# Patient Record
Sex: FEMALE | Race: WHITE
[De-identification: names, ages, dates, MRNs, and addresses within clinical notes are randomized per-mention and may not be internally consistent; named-entity substitution may affect disease eponyms.]

---

## 2019-09-25 ENCOUNTER — HOSPITAL ENCOUNTER (EMERGENCY)
Dept: HOSPITAL 75 - ER | Age: 19
LOS: 1 days | Discharge: HOME | End: 2019-09-26
Payer: COMMERCIAL

## 2019-09-25 VITALS — BODY MASS INDEX: 11.76 KG/M2 | HEIGHT: 66.93 IN | WEIGHT: 74.96 LBS

## 2019-09-25 DIAGNOSIS — R19.7: ICD-10-CM

## 2019-09-25 DIAGNOSIS — R10.84: Primary | ICD-10-CM

## 2019-09-25 PROCEDURE — 85027 COMPLETE CBC AUTOMATED: CPT

## 2019-09-25 PROCEDURE — 85007 BL SMEAR W/DIFF WBC COUNT: CPT

## 2019-09-25 PROCEDURE — 80053 COMPREHEN METABOLIC PANEL: CPT

## 2019-09-25 PROCEDURE — 84703 CHORIONIC GONADOTROPIN ASSAY: CPT

## 2019-09-25 PROCEDURE — 83735 ASSAY OF MAGNESIUM: CPT

## 2019-09-25 PROCEDURE — 36415 COLL VENOUS BLD VENIPUNCTURE: CPT

## 2019-09-25 PROCEDURE — 83690 ASSAY OF LIPASE: CPT

## 2019-09-25 PROCEDURE — 81000 URINALYSIS NONAUTO W/SCOPE: CPT

## 2019-09-26 LAB
ALBUMIN SERPL-MCNC: 4.3 GM/DL (ref 3.2–4.5)
ALP SERPL-CCNC: 100 U/L (ref 40–136)
ALT SERPL-CCNC: 14 U/L (ref 0–55)
APTT PPP: YELLOW S
BACTERIA #/AREA URNS HPF: (no result) /HPF
BASOPHILS # BLD AUTO: 0 10^3/UL (ref 0–0.1)
BASOPHILS NFR BLD AUTO: 0 % (ref 0–10)
BASOPHILS NFR BLD MANUAL: 0 %
BILIRUB SERPL-MCNC: 0.2 MG/DL (ref 0.1–1)
BILIRUB UR QL STRIP: NEGATIVE
BUN/CREAT SERPL: 14
CALCIUM SERPL-MCNC: 9.2 MG/DL (ref 8.5–10.1)
CHLORIDE SERPL-SCNC: 106 MMOL/L (ref 98–107)
CO2 SERPL-SCNC: 23 MMOL/L (ref 21–32)
CREAT SERPL-MCNC: 0.78 MG/DL (ref 0.6–1.3)
EOSINOPHIL # BLD AUTO: 0.6 10^3/UL (ref 0–0.3)
EOSINOPHIL NFR BLD AUTO: 4 % (ref 0–10)
EOSINOPHIL NFR BLD MANUAL: 5 %
ERYTHROCYTE [DISTWIDTH] IN BLOOD BY AUTOMATED COUNT: 13 % (ref 10–14.5)
FIBRINOGEN PPP-MCNC: CLEAR MG/DL
GFR SERPLBLD BASED ON 1.73 SQ M-ARVRAT: > 60 ML/MIN
GLUCOSE SERPL-MCNC: 115 MG/DL (ref 70–105)
GLUCOSE UR STRIP-MCNC: NEGATIVE MG/DL
HCT VFR BLD CALC: 42 % (ref 35–52)
HGB BLD-MCNC: 14 G/DL (ref 11.5–16)
KETONES UR QL STRIP: NEGATIVE
LEUKOCYTE ESTERASE UR QL STRIP: NEGATIVE
LYMPHOCYTES # BLD AUTO: 2.4 X 10^3 (ref 1–4)
LYMPHOCYTES NFR BLD AUTO: 15 % (ref 12–44)
MAGNESIUM SERPL-MCNC: 2.3 MG/DL (ref 1.6–2.4)
MANUAL DIFFERENTIAL PERFORMED BLD QL: YES
MCH RBC QN AUTO: 31 PG (ref 25–34)
MCHC RBC AUTO-ENTMCNC: 33 G/DL (ref 32–36)
MCV RBC AUTO: 92 FL (ref 80–99)
MONOCYTES # BLD AUTO: 1.1 X 10^3 (ref 0–1)
MONOCYTES NFR BLD AUTO: 7 % (ref 0–12)
MONOCYTES NFR BLD: 5 %
NEUTROPHILS # BLD AUTO: 11.6 X 10^3 (ref 1.8–7.8)
NEUTROPHILS NFR BLD AUTO: 74 % (ref 42–75)
NEUTS BAND NFR BLD MANUAL: 71 %
NEUTS BAND NFR BLD: 2 %
NITRITE UR QL STRIP: NEGATIVE
PH UR STRIP: 7 [PH] (ref 5–9)
PLATELET # BLD: 320 10^3/UL (ref 130–400)
PMV BLD AUTO: 9.6 FL (ref 7.4–10.4)
POTASSIUM SERPL-SCNC: 3.9 MMOL/L (ref 3.6–5)
PROT SERPL-MCNC: 7.8 GM/DL (ref 6.4–8.2)
PROT UR QL STRIP: NEGATIVE
RBC #/AREA URNS HPF: (no result) /HPF
RBC MORPH BLD: NORMAL
SODIUM SERPL-SCNC: 139 MMOL/L (ref 135–145)
SP GR UR STRIP: 1.01 (ref 1.02–1.02)
SQUAMOUS #/AREA URNS HPF: (no result) /HPF
UROBILINOGEN UR-MCNC: NORMAL MG/DL
VARIANT LYMPHS NFR BLD MANUAL: 14 %
VARIANT LYMPHS NFR BLD MANUAL: 3 %
WBC # BLD AUTO: 15.7 10^3/UL (ref 4.3–11)
WBC #/AREA URNS HPF: (no result) /HPF

## 2019-09-26 NOTE — ED ABDOMINAL PAIN
General


Chief Complaint:  Abdominal/GI Problems


Stated Complaint:  ABD PAIN


Nursing Triage Note:  


Pt amb to room #5 with c/o medial lt abd discomfort that began approx 0300 while




@ work this morning. Pt reports discomfort is accompanied by nausea, diarrhea, 


and chills.  Pt states, "I have a history of gas problems." Pt reports @ 2130 on




this day she took x1 gasX and "some" milk of magnesia.


Source of Information:  Patient


Exam Limitations:  No Limitations





History of Present Illness


Date Seen by Provider:  Sep 25, 2019


Time Seen by Provider:  23:25


Initial Comments


This 18-year-old young lady presents to the emergency room with an acute episode

of abdominal pain.  It seems to be migratory and involves both the epigastric 

region in the lower quadrants.  Vital signs are stable and she is afebrile.  

Symptoms started around 03:00.  Pain waxes and wanes.  She has nausea without 

vomiting.  She has had chills without fever.  She has had multiple episodes of 

diarrhea.  She took Gas-X and milk of magnesia hoping to "flush everything out".

 She also has a rash on the left arm that has been present for about 4 days now.

 It is pruritic and she does not know the cause.  Dr. James is her primary 

care provider.  Patient notes that she has had other episodes similar to this 

but of lesser intensity in the past.  She wonders if perhaps she has irritable 

bowel syndrome.





Allergies and Home Medications


Allergies


Coded Allergies:  


     No Known Drug Allergies (Unverified , 11/2/14)





Home Medications


Dicyclomine Hcl 20 Mg Tablet, 1 EACH PO QID PRN


   Prescribed by: SARAH SANCHEZ on 11/2/14 2319


Doxycycline Hyclate 100 Mg Capsule, 1 CAP PO BID


   start the day after finishing bactrim for treatment of urinary tract infecit

on 


   Prescribed by: KELSEY BURRELL on 1/1/15 1425


Hyoscyamine Sulfate 0.125 Mg Tab.subl, 0.125 MG SL Q4H PRN for CRAMPS


   Prescribed by: TODD HUMPHREYS on 9/26/19 0109


Ondansetron 4 Mg Tab.rapdis, 4 MG SL Q4H PRN for NAUSEA/VOMITING


   Prescribed by: TODD HUMPHREYS on 9/26/19 0109


Sulfamethoxazole/Trimethoprim 1 Tab Tablet, 1 TAB PO BID


   Prescribed by: KELSEY BURRELL on 1/1/15 1427





Patient Home Medication List


Home Medication List Reviewed:  Yes





Review of Systems


Review of Systems


Constitutional:  see HPI


EENTM:  No Symptoms Reported


Respiratory:  No Symptoms Reported


Cardiovascular:  No Symptoms Reported


Gastrointestinal:  See HPI


Genitourinary:  No Symptoms Reported


Musculoskeletal:  no symptoms reported


Skin:  no symptoms reported


Psychiatric/Neurological:  No Symptoms Reported


Endocrine:  No Symptoms Reported


Hematologic/Lymphatic:  No Symptoms Reported





Past Medical-Social-Family Hx


Past Med/Social Hx:  Reviewed Nursing Past Med/Soc Hx


Patient Social History


Recent Foreign Travel:  No


Contact w/Someone Who Travel:  No


Recent Infectious Disease Expo:  No


Ebola Symptoms:  Stomach Pain





Immunizations Up To Date


Tetanus Booster (TDap):  Unknown


PED Vaccines UTD:  Yes





Seasonal Allergies


Seasonal Allergies:  No





Past Medical History


Surgeries:  No


Respiratory:  No


Cardiac:  No


Neurological:  No


Pregnant:  No


Reproductive Disorders:  No


Female Reproductive Disorders:  Denies


Sexually Transmitted Disease:  No


HIV/AIDS:  No


Gastrointestinal:  No


Musculoskeletal:  No


Endocrine:  No


HEENT:  No


Cancer:  No


Psychosocial:  No


Integumentary:  No


Blood Disorders:  No


Adverse Reaction/Blood Tranf:  No





Family Medical History








No Family History of:


  AIDS


  Abdominal aortic aneurysm


  Shackelford's disease


  Alcoholism


  Alzheimer's disease


  Aphasia


  Arthritis


  Asthma


  Cancer of mouth


  Cardiovascular disease


  Cataracts


  Colon cancer


  Completed stroke


  Congenital disease


  Congenital heart disease


  Coronary thrombosis


  Cystic fibrosis


  Deafness or hearing loss


  Dementia


  Diabetes mellitus


  Drug abuse


  Dysphasia


  Fibrocystic disease of breast


  Gastroenteritis


  Glaucoma


  Headache disorder


  Hypercholesterolemia


  Hypertension


  Infertility


  Kidney disease


  Myocardial infarction


  Neoplasm


  Not obtainable due to adoption


  Osteoporosis


  Parkinson's disease


  Prostate cancer


  Psychosocial problem


  Respiratory disorder


  Seizure disorder


  Severe allergy


  Thyroid disease


  Tuberculosis





Physical Exam


Vital Signs





Vital Signs - First Documented








 9/25/19 9/26/19





 22:55 01:12


 


Temp 36.9 


 


Pulse 64 


 


Resp 16 


 


B/P (MAP) 125/74 


 


Pulse Ox  100


 


O2 Delivery Room Air 





Capillary Refill :


Height/Weight/BMI


Height: 5'9.00"


Weight: 151lbs. 1.0oz. [id number]; 11.00 BMI


Method:Estimated


General Appearance:  WD/WN, no apparent distress


HEENT:  PERRL/EOMI, normal ENT inspection


Neck:  normal inspection


Respiratory:  lungs clear, normal breath sounds, no respiratory distress, no 

accessory muscle use


Cardiovascular:  regular rate, rhythm, no edema, no murmur


Gastrointestinal:  normal bowel sounds, soft; No distended, No guarding; 

tenderness (mild in the epigastrium and lower quadrants)


Extremities:  normal inspection, no pedal edema


Neurologic/Psychiatric:  CNs II-XII nml as tested, no motor/sensory deficits, 

alert, normal mood/affect, oriented x 3


Skin:  normal color, warm/dry





Progress/Results/Core Measures


Results/Orders


Lab Results





Laboratory Tests








Test


 9/25/19


23:03 9/26/19


00:12 Range/Units


 


 


White Blood Count


 15.7 H


 


 4.3-11.0


10^3/uL


 


Red Blood Count


 4.59 


 


 4.35-5.85


10^6/uL


 


Hemoglobin 14.0   11.5-16.0  G/DL


 


Hematocrit 42   35-52  %


 


Mean Corpuscular Volume 92   80-99  FL


 


Mean Corpuscular Hemoglobin 31   25-34  PG


 


Mean Corpuscular Hemoglobin


Concent 33 


 


 32-36  G/DL





 


Red Cell Distribution Width 13.0   10.0-14.5  %


 


Platelet Count


 320 


 


 130-400


10^3/uL


 


Mean Platelet Volume 9.6   7.4-10.4  FL


 


Neutrophils (%) (Auto) 74   42-75  %


 


Lymphocytes (%) (Auto) 15   12-44  %


 


Monocytes (%) (Auto) 7   0-12  %


 


Eosinophils (%) (Auto) 4   0-10  %


 


Basophils (%) (Auto) 0   0-10  %


 


Neutrophils # (Auto) 11.6 H  1.8-7.8  X 10^3


 


Lymphocytes # (Auto) 2.4   1.0-4.0  X 10^3


 


Monocytes # (Auto) 1.1 H  0.0-1.0  X 10^3


 


Eosinophils # (Auto)


 0.6 H


 


 0.0-0.3


10^3/uL


 


Basophils # (Auto)


 0.0 


 


 0.0-0.1


10^3/uL


 


Neutrophils % (Manual) 71    %


 


Lymphocytes % (Manual) 14    %


 


Monocytes % (Manual) 5    %


 


Eosinophils % (Manual) 5    %


 


Basophils % (Manual) 0    %


 


Band Neutrophils 2    %


 


Reactive Lymphocytes 3    %


 


Blood Morphology Comment NORMAL    


 


Sodium Level 139   135-145  MMOL/L


 


Potassium Level 3.9   3.6-5.0  MMOL/L


 


Chloride Level 106     MMOL/L


 


Carbon Dioxide Level 23   21-32  MMOL/L


 


Anion Gap 10   5-14  MMOL/L


 


Blood Urea Nitrogen 11   7-18  MG/DL


 


Creatinine


 0.78 


 


 0.60-1.30


MG/DL


 


Estimat Glomerular Filtration


Rate > 60 


 


  





 


BUN/Creatinine Ratio 14    


 


Glucose Level 115 H    MG/DL


 


Calcium Level 9.2   8.5-10.1  MG/DL


 


Corrected Calcium 9.0   8.5-10.1  MG/DL


 


Magnesium Level 2.3   1.6-2.4  MG/DL


 


Total Bilirubin 0.2   0.1-1.0  MG/DL


 


Aspartate Amino Transf


(AST/SGOT) 12 


 


 5-34  U/L





 


Alanine Aminotransferase


(ALT/SGPT) 14 


 


 0-55  U/L





 


Alkaline Phosphatase 100     U/L


 


Total Protein 7.8   6.4-8.2  GM/DL


 


Albumin 4.3   3.2-4.5  GM/DL


 


Lipase 17   8-78  U/L


 


Serum Pregnancy Test,


Qualitative NEGATIVE 


 


 NEGATIVE  





 


Urine Color  YELLOW   


 


Urine Clarity  CLEAR   


 


Urine pH  7  5-9  


 


Urine Specific Gravity  1.010 L 1.016-1.022  


 


Urine Protein  NEGATIVE  NEGATIVE  


 


Urine Glucose (UA)  NEGATIVE  NEGATIVE  


 


Urine Ketones  NEGATIVE  NEGATIVE  


 


Urine Nitrite  NEGATIVE  NEGATIVE  


 


Urine Bilirubin  NEGATIVE  NEGATIVE  


 


Urine Urobilinogen  NORMAL  NORMAL  MG/DL


 


Urine Leukocyte Esterase  NEGATIVE  NEGATIVE  


 


Urine RBC (Auto)  2+ H NEGATIVE  


 


Urine RBC  0-2   /HPF


 


Urine WBC  NONE   /HPF


 


Urine Squamous Epithelial


Cells 


 2-5 


  /HPF





 


Urine Crystals  NONE   /LPF


 


Urine Bacteria  TRACE   /HPF


 


Urine Casts  NONE   /LPF


 


Urine Mucus  NEGATIVE   /LPF


 


Urine Culture Indicated  NO   








My Orders





Orders - TODD BECKER MD


Cbc With Automated Diff (9/25/19 23:03)


Hcg,Qualitative Serum (9/25/19 23:03)


Comprehensive Metabolic Panel (9/25/19 23:03)


Magnesium (9/25/19 23:03)


Lipase (9/26/19 00:26)


Ua Culture If Indicated (9/26/19 00:26)


Ketorolac Injection (Toradol Injection) (9/26/19 00:30)


Manual Differential (9/25/19 23:03)


Hyoscyamine Sl Tablet (Levsin Sl Tablet) (9/26/19 01:00)


Ondansetron Injection (Zofran Injectio (9/26/19 01:00)


Famotidine Injection (Pepcid Injection) (9/26/19 01:00)


Lactated Ringers (Lr 1000 Ml Iv Solution (9/26/19 01:00)





Medications Given in ED





Current Medications








 Medications  Dose


 Ordered  Sig/Joe


 Route  Start Time


 Stop Time Status Last Admin


Dose Admin


 


 Famotidine  20 mg  ONCE  ONCE


 IVP  9/26/19 01:00


 9/26/19 01:01 DC 9/25/19 23:44


20 MG


 


 Hyoscyamine


 Sulfate  0.25 mg  ONCE  ONCE


 PO  9/26/19 01:00


 9/26/19 01:01 DC 9/25/19 23:43


0.25 MG


 


 Ketorolac


 Tromethamine  15 mg  ONCE  ONCE


 IVP  9/26/19 00:30


 9/26/19 00:31 DC 9/26/19 00:35


15 MG


 


 Ondansetron HCl  8 mg  ONCE  ONCE


 IVP  9/26/19 01:00


 9/26/19 01:01 DC 9/25/19 23:43


8 MG








Vital Signs/I&O











 9/25/19 9/26/19





 22:55 01:12


 


Temp 36.9 36.9


 


Pulse 64 91


 


Resp 16 16


 


B/P (MAP) 125/74 


 


Pulse Ox  100


 


O2 Delivery Room Air Room Air











Progress


Progress Note #1:  


   Time:  00:42


Progress Note


Patient states her pain is actually worse after Levsin, Zofran, and Pepcid.  We 

will try some Toradol.  Labs showed leukocytosis but no other abnormalities.  We

will check a lipase as well.  Patient's symptoms sound viral in nature.  Her 

abdominal pain is migratory and accompanied by diarrhea.  Symptoms may been made

worse by the fact that she took milk of magnesia.


Progress Note #2:  


Progress Note


Toradol did significantly improve her pain.  She was dismissed home.





Departure


Impression





   Primary Impression:  


   Generalized abdominal cramping


   Additional Impressions:  


   Generalized abdominal pain


   Diarrhea


   Qualified Codes:  R19.7 - Diarrhea, unspecified


Disposition:  01 HOME, SELF-CARE


Condition:  Improved





Departure-Patient Inst.


Decision time for Depature:  01:04


Referrals:  


LEANDRO JAMES MD (PCP/Family)


Primary Care Physician


Patient Instructions:  Acute Abdomen (Belly Pain), Viral Gastroenteritis





Add. Discharge Instructions:  


The exact cause of your abdominal pain and cramping is uncertain but may be 

related to viral gastroenteritis or irritable bowel syndrome.


If you're still experiencing cramping and nausea later this morning, fill the 

prescriptions provided.


Stick with a clear liquid diet through the night until your symptoms calmed 

down.  Then gradually advance your diet with small quantities of bland food as 

tolerated.


You may take Tylenol (acetaminophen) up to 1000 mg every 6 hours as needed.  You

may try taking ibuprofen 6-8 hours after your IV Toradol dose if you have pain 

not controlled by Tylenol.


For upper abdominal pain, you may take an antacid such as omeprazole, Tums, 

Pepcid, etc.


For cramping and diarrhea you may try the Levsin (hyoscyamine) as prescribed.


For nausea try Zofran (ondansetron) dissolved under your tongue as prescribed.


Return to the emergency room if you have worsening symptoms.  Otherwise, please 

follow-up with your primary care provider in the near future.











All discharge instructions reviewed with patient and/or family. Voiced 

understanding.


Scripts


Hyoscyamine Sulfate (Levsin-Sl) 0.125 Mg Tab.subl


0.125 MG SL Q4H PRN for CRAMPS, #10 TAB 0 Refills


   Prov: TODD BECKER MD         9/26/19 


Ondansetron (Ondansetron Odt) 4 Mg Tab.rapdis


4 MG SL Q4H PRN for NAUSEA/VOMITING, #10 TAB


   Prov: TODD BECKER MD         9/26/19





Copy


Copies To 1:   LEANDRO JAMES MD, JOSHUA T MD        Sep 26, 2019 00:43